# Patient Record
Sex: FEMALE | Race: WHITE | NOT HISPANIC OR LATINO
[De-identification: names, ages, dates, MRNs, and addresses within clinical notes are randomized per-mention and may not be internally consistent; named-entity substitution may affect disease eponyms.]

---

## 2021-09-30 ENCOUNTER — APPOINTMENT (OUTPATIENT)
Dept: DISASTER EMERGENCY | Facility: CLINIC | Age: 71
End: 2021-09-30

## 2021-09-30 ENCOUNTER — LABORATORY RESULT (OUTPATIENT)
Age: 71
End: 2021-09-30

## 2021-10-04 PROBLEM — Z00.00 ENCOUNTER FOR PREVENTIVE HEALTH EXAMINATION: Status: ACTIVE | Noted: 2021-10-04

## 2022-06-22 ENCOUNTER — APPOINTMENT (OUTPATIENT)
Dept: ORTHOPEDIC SURGERY | Facility: CLINIC | Age: 72
End: 2022-06-22
Payer: MEDICARE

## 2022-06-22 VITALS — WEIGHT: 130 LBS | BODY MASS INDEX: 26.21 KG/M2 | HEIGHT: 59 IN

## 2022-06-22 DIAGNOSIS — F17.200 NICOTINE DEPENDENCE, UNSPECIFIED, UNCOMPLICATED: ICD-10-CM

## 2022-06-22 DIAGNOSIS — Z78.9 OTHER SPECIFIED HEALTH STATUS: ICD-10-CM

## 2022-06-22 DIAGNOSIS — Z79.899 OTHER LONG TERM (CURRENT) DRUG THERAPY: ICD-10-CM

## 2022-06-22 DIAGNOSIS — E78.00 PURE HYPERCHOLESTEROLEMIA, UNSPECIFIED: ICD-10-CM

## 2022-06-22 PROCEDURE — 99204 OFFICE O/P NEW MOD 45 MIN: CPT

## 2022-06-22 NOTE — ASSESSMENT
[FreeTextEntry1] : Right ulnar styloid fracture, nondisplaced - will manage with closed management\par \par Reviewed radiographs with patient and discussed pathoanatomy. Discussed alignment is within acceptable parameters to manage with closed management in brace. Discussed risk of stiffness, late tendon injury, and displacement requiring operative intervention. NWB, elevate, NSAIDs prn.\par \par Right wrist brace provided.\par \par F/u 4weeks (show MetaGrip and consider CSI)

## 2022-06-22 NOTE — HISTORY OF PRESENT ILLNESS
[de-identified] : 71F, RHD, PMHX of Hyperlipidemia presents with right hand injury from 2.5 weeks ago. Patient reports was still having pain so went to [facility] on 6/21/22 for x-rays and was advised of a fracture. Admits to being splinted, does have pain, has good ROM of fingers, denies numbness/tingling.

## 2022-06-22 NOTE — IMAGING
[de-identified] : Right wrist with mild swelling, skin intact, no ecchymosis. +ttp at ulnar styloid. Able to make fist, oppose thumb to small finger and abduct fingers. Sensation intact throughout. <2sec cap refill. \par \par Right wrist radiographs with ulnar styloid fracture, nondisplaced. Thumb CMC arthrosis.

## 2022-07-13 ENCOUNTER — APPOINTMENT (OUTPATIENT)
Dept: ORTHOPEDIC SURGERY | Facility: CLINIC | Age: 72
End: 2022-07-13

## 2022-07-13 DIAGNOSIS — S52.613A DISPLACED FRACTURE OF UNSPECIFIED ULNA STYLOID PROCESS, INITIAL ENCOUNTER FOR CLOSED FRACTURE: ICD-10-CM

## 2022-07-13 PROCEDURE — 99213 OFFICE O/P EST LOW 20 MIN: CPT

## 2022-07-13 NOTE — HISTORY OF PRESENT ILLNESS
[de-identified] : 71F, RHD, PMHX of Hyperlipidemia presents with right hand injury from 2.5 weeks ago. Patient reports was still having pain so went to [facility] on 6/21/22 for x-rays and was advised of a fracture. Admits to being splinted, does have pain, has good ROM of fingers, denies numbness/tingling. \par \par 7/13/22: f/u right hand. reports brace is helping with pain. still has intermittent pain. Denies numbness/tingling.

## 2022-07-13 NOTE — ASSESSMENT
[FreeTextEntry1] : Right ulnar styloid fracture, nondisplaced - will continue to manage with closed management\par \par Reviewed radiographs with patient and discussed pathoanatomy. Discussed alignment is within acceptable parameters to manage with closed management in brace. Discussed risk of stiffness, late tendon injury, and displacement requiring operative intervention. elevate, NSAIDs prn. D/c brace, ease into WBing\par \par F/u prn

## 2022-07-13 NOTE — IMAGING
[de-identified] : Right wrist with resolved swelling, skin intact, no ecchymosis. -ttp at ulnar styloid. Able to make fist, oppose thumb to small finger and abduct fingers. Sensation intact throughout. <2sec cap refill. \par \par Right wrist radiographs with ulnar styloid fracture, nondisplaced. Thumb CMC arthrosis.

## 2024-01-30 ENCOUNTER — APPOINTMENT (OUTPATIENT)
Dept: ORTHOPEDIC SURGERY | Facility: CLINIC | Age: 74
End: 2024-01-30
Payer: MEDICARE

## 2024-01-30 VITALS — BODY MASS INDEX: 26.21 KG/M2 | HEIGHT: 59 IN | WEIGHT: 130 LBS

## 2024-01-30 PROCEDURE — 99214 OFFICE O/P EST MOD 30 MIN: CPT

## 2024-01-30 RX ORDER — VENLAFAXINE 100 MG/1
100 TABLET ORAL
Refills: 0 | Status: ACTIVE | COMMUNITY

## 2024-01-30 RX ORDER — SIMVASTATIN 80 MG/1
TABLET, FILM COATED ORAL
Refills: 0 | Status: ACTIVE | COMMUNITY

## 2024-01-30 NOTE — HISTORY OF PRESENT ILLNESS
[de-identified] : Date of Injury/Onset:     1 YEAR Pain: At Rest:9 /10   With Activity: 10/10 Affecting Sleep:Y Difficulty with stairs:Y Difficulty getting in and ouYt of car:Y Sit to stand stiffness:Y Affects walking short/long distances?Y Home/Work/Recreation effected?Y   Mechanism of injury: NKI This is not a Work-Related Injury being treated under Worker's Compensation. This  is not   an athletic injury occurring associated with an interscholastic or organized sports team. Quality of symptoms:C/O RIGHT GROIN PAIN THAT GOES DOWN LEG, Improves with:   BENDING LEG WHILE LAYING DOWN Worse with:   GETTING IN CAR, STAIRS, WALKING Have you been treated for this in the past? Y - DR CORTEZ Have you had surgery for this in the past?N OTC Medicines:IBUPROFEN RX medicines: Heat, Ice, Elevation: NONE CSI or Gel Injections:  (Indicate which)CSI X 2 DIDNT HELP- LAST 12/23 Physical Therapy/ HEP:  NO Previous Treatment/Imaging/Studies Since Last Visit: [facility]  Reports Available for Review Today:

## 2024-01-30 NOTE — DISCUSSION/SUMMARY
[de-identified] : Lengthy discussion regarding options was had with the patient. Nonsurgical options including but not limited to cortisone, Visco supplementation, anti-inflammatory medications (both steroidal and non-steroidal), activity modification, non-impact exercise, maintaining a healthy BMI, bracing, and icing were reviewed. Surgical options including but not limited to arthroscopy, and joint replacement were discussed as was risks, benefits and alternatives.  I spent time going in detail the problem and the associated risks/benefits of right MADHURI surgery. Went into detailed discussion of complications including but not limited to, nerve injury, non-union, repeat fracture, DVT /PE /pe postop, instability, transfusion, infection, NVA injury, stiffness, leg length discrepancy, inability to ambulate & death. Discussed implant and model shown to patient. Patient had ample time to ask any questions, and at this time answered all patient questions, should anymore arise they were instructed to follow up. Patient expressed understanding of the proposed procedure and postop directions. Patient has failed non-surgical treatment and PT is contraindicated at this time.  todays plan is for patient to procced with right MADHURI.

## 2024-01-30 NOTE — PHYSICAL EXAM
[Right] : right hip [5___] : adduction 5[unfilled]/5 [] : patient ambulates without assistive device [FreeTextEntry9] : Flex 100 * IR 30 * EXT 30 * ABD 30* [de-identified] : flexed hip gait.

## 2024-03-25 ENCOUNTER — APPOINTMENT (OUTPATIENT)
Dept: ORTHOPEDIC SURGERY | Facility: AMBULATORY MEDICAL SERVICES | Age: 74
End: 2024-03-25
Payer: MEDICARE

## 2024-03-25 PROCEDURE — 27130 TOTAL HIP ARTHROPLASTY: CPT | Mod: AS,RT

## 2024-03-25 PROCEDURE — 20610 DRAIN/INJ JOINT/BURSA W/O US: CPT | Mod: RT,59

## 2024-03-25 PROCEDURE — 27130 TOTAL HIP ARTHROPLASTY: CPT | Mod: RT

## 2024-04-08 ENCOUNTER — APPOINTMENT (OUTPATIENT)
Dept: ORTHOPEDIC SURGERY | Facility: CLINIC | Age: 74
End: 2024-04-08
Payer: MEDICARE

## 2024-04-08 PROCEDURE — 99024 POSTOP FOLLOW-UP VISIT: CPT

## 2024-04-08 NOTE — PHYSICAL EXAM
[Right] : right hip [5___] : adduction 5[unfilled]/5 [] : ambulation with cane [FreeTextEntry3] : Lucy Removed C/D/I  [FreeTextEntry8] : Over incision  [FreeTextEntry9] : Flex 100 * IR 30 * EXT 45* *

## 2024-04-08 NOTE — DISCUSSION/SUMMARY
[de-identified] : Discussed importance of non-impact exercise and muscle stretching before and after exercise.   Explained the importance of ice and rest.    Stretching exercises shown, Explained the importance of Range of Motion before strength.   advised No dog walking  (135lb dog)  OPPT Rx  F/U 4-5 weeks

## 2024-04-08 NOTE — REASON FOR VISIT
[FreeTextEntry2] : here for f/u right MADHURI 3/25/24.  using cane for ambulation and was not given stockings from hospital.  using asa bid for anticoag. still getting home PT.  using tylenol pain.

## 2024-05-09 ENCOUNTER — RESULT CHARGE (OUTPATIENT)
Age: 74
End: 2024-05-09

## 2024-05-09 ENCOUNTER — APPOINTMENT (OUTPATIENT)
Dept: ORTHOPEDIC SURGERY | Facility: CLINIC | Age: 74
End: 2024-05-09
Payer: MEDICARE

## 2024-05-09 VITALS — BODY MASS INDEX: 26.21 KG/M2 | HEIGHT: 59 IN | WEIGHT: 130 LBS

## 2024-05-09 DIAGNOSIS — M16.11 UNILATERAL PRIMARY OSTEOARTHRITIS, RIGHT HIP: ICD-10-CM

## 2024-05-09 PROCEDURE — 99024 POSTOP FOLLOW-UP VISIT: CPT

## 2024-05-09 PROCEDURE — 73502 X-RAY EXAM HIP UNI 2-3 VIEWS: CPT

## 2024-05-09 NOTE — PHYSICAL EXAM
[5___] : adduction 5[unfilled]/5 [] : light touch intact throughout [Right] : right hip with pelvis [All Views] : anteroposterior, lateral [Components well fixed, in good position] : Components well fixed, in good position [FreeTextEntry9] : Flex 100 * IR 45* EXT 50 * ABD 50*

## 2024-05-09 NOTE — DISCUSSION/SUMMARY
[de-identified] : Discussed importance of non-impact exercise and muscle stretching before and after exercise. Reviewed x-rays Explained the importance of ice and rest.    Stretching exercises shown,  F/U 6 weeks

## 2024-05-09 NOTE — REASON FOR VISIT
[FreeTextEntry2] : here for f/u right MADHURI 3/25/24.  patient is still doing PT.  using cane for ambulation.  feels like she has turned corner.  no pain meds.

## 2024-06-14 ENCOUNTER — NON-APPOINTMENT (OUTPATIENT)
Age: 74
End: 2024-06-14

## 2024-06-18 ENCOUNTER — APPOINTMENT (OUTPATIENT)
Dept: ORTHOPEDIC SURGERY | Facility: CLINIC | Age: 74
End: 2024-06-18
Payer: MEDICARE

## 2024-06-18 DIAGNOSIS — Z96.641 PRESENCE OF RIGHT ARTIFICIAL HIP JOINT: ICD-10-CM

## 2024-06-18 PROCEDURE — 73502 X-RAY EXAM HIP UNI 2-3 VIEWS: CPT

## 2024-06-18 PROCEDURE — 99024 POSTOP FOLLOW-UP VISIT: CPT

## 2024-06-18 NOTE — DISCUSSION/SUMMARY
[de-identified] : The patient was advised of the diagnosis. The natural history of the pathology was explained in full to the patient in layman's terms. Several different treatment options were discussed and explained in full to the patient including the risks and benefits of both surgical and non-surgical treatments. All questions and concerns were answered.  Discussed importance of non-impact exercise and muscle stretching before and after exercise. Reviewed x-rays Explained the importance of ice and rest.    Stretching exercises shown, Explained the importance of Range of Motion before strength.  As discussed with patient, the plan is for the patient to observe and continue to self-manage, these including but not limited to HEP, Ice, NSAIDs PRN and rest. Patient was instructed to f/u in 9 months

## 2024-06-18 NOTE — HISTORY OF PRESENT ILLNESS
[de-identified] : Pt here for f/u right MADHURI 3/25/24. The patient reports she finished PT on 06/12/24. Pt reports once in a while she will take a Celebrex. The patient reports she discontinued using the cane.

## 2024-06-18 NOTE — PHYSICAL EXAM
[5___] : adduction 5[unfilled]/5 [] : patient ambulates without assistive device [Right] : right hip with pelvis [AP] : anteroposterior [Lateral] : lateral [FreeTextEntry9] : Well-aligned, well-fixed prosthesis. No lucency noted.

## 2024-08-22 ENCOUNTER — NON-APPOINTMENT (OUTPATIENT)
Age: 74
End: 2024-08-22

## 2025-01-24 ENCOUNTER — NON-APPOINTMENT (OUTPATIENT)
Age: 75
End: 2025-01-24

## 2025-03-25 ENCOUNTER — APPOINTMENT (OUTPATIENT)
Dept: ORTHOPEDIC SURGERY | Facility: CLINIC | Age: 75
End: 2025-03-25

## 2025-03-25 DIAGNOSIS — S82.61XA DISPLACED FRACTURE OF LATERAL MALLEOLUS OF RIGHT FIBULA, INITIAL ENCOUNTER FOR CLOSED FRACTURE: ICD-10-CM

## 2025-03-25 PROCEDURE — 27786 TREATMENT OF ANKLE FRACTURE: CPT | Mod: RT

## 2025-03-25 PROCEDURE — 99213 OFFICE O/P EST LOW 20 MIN: CPT | Mod: 57

## 2025-03-27 ENCOUNTER — APPOINTMENT (OUTPATIENT)
Dept: ORTHOPEDIC SURGERY | Facility: CLINIC | Age: 75
End: 2025-03-27
Payer: MEDICARE

## 2025-03-27 DIAGNOSIS — Z96.641 PRESENCE OF RIGHT ARTIFICIAL HIP JOINT: ICD-10-CM

## 2025-03-27 PROCEDURE — 99213 OFFICE O/P EST LOW 20 MIN: CPT

## 2025-03-27 PROCEDURE — 73502 X-RAY EXAM HIP UNI 2-3 VIEWS: CPT | Mod: RT

## 2025-03-27 PROCEDURE — 99212 OFFICE O/P EST SF 10 MIN: CPT | Mod: 24

## 2025-05-06 ENCOUNTER — APPOINTMENT (OUTPATIENT)
Dept: ORTHOPEDIC SURGERY | Facility: CLINIC | Age: 75
End: 2025-05-06
Payer: MEDICARE

## 2025-05-06 DIAGNOSIS — S82.61XA DISPLACED FRACTURE OF LATERAL MALLEOLUS OF RIGHT FIBULA, INITIAL ENCOUNTER FOR CLOSED FRACTURE: ICD-10-CM

## 2025-05-06 PROCEDURE — 99024 POSTOP FOLLOW-UP VISIT: CPT

## 2025-05-06 PROCEDURE — 73610 X-RAY EXAM OF ANKLE: CPT | Mod: RT

## 2025-06-17 ENCOUNTER — APPOINTMENT (OUTPATIENT)
Dept: ORTHOPEDIC SURGERY | Facility: CLINIC | Age: 75
End: 2025-06-17
Payer: MEDICARE

## 2025-06-17 PROCEDURE — 99024 POSTOP FOLLOW-UP VISIT: CPT
